# Patient Record
Sex: FEMALE | Race: WHITE | NOT HISPANIC OR LATINO | Employment: OTHER | URBAN - NONMETROPOLITAN AREA
[De-identification: names, ages, dates, MRNs, and addresses within clinical notes are randomized per-mention and may not be internally consistent; named-entity substitution may affect disease eponyms.]

---

## 2017-03-01 RX ORDER — FLUTICASONE PROPIONATE 50 MCG
SPRAY, SUSPENSION (ML) NASAL
Qty: 16 G | Refills: 0 | Status: SHIPPED | OUTPATIENT
Start: 2017-03-01 | End: 2017-03-31

## 2017-07-05 DIAGNOSIS — N32.81 OVERACTIVE BLADDER: ICD-10-CM

## 2017-07-05 DIAGNOSIS — N32.81 OVERACTIVE BLADDER: Primary | ICD-10-CM

## 2017-07-05 RX ORDER — OXYBUTYNIN CHLORIDE 5 MG/1
TABLET, EXTENDED RELEASE ORAL
Qty: 30 TABLET | Refills: 0 | OUTPATIENT
Start: 2017-07-05

## 2017-07-05 RX ORDER — OXYBUTYNIN CHLORIDE 5 MG/1
TABLET, EXTENDED RELEASE ORAL
Qty: 30 TABLET | Refills: 0 | Status: SHIPPED | OUTPATIENT
Start: 2017-07-05

## 2017-08-30 ENCOUNTER — OFFICE VISIT (OUTPATIENT)
Dept: OTOLARYNGOLOGY | Facility: CLINIC | Age: 82
End: 2017-08-30

## 2017-08-30 VITALS
WEIGHT: 127.6 LBS | HEIGHT: 69 IN | TEMPERATURE: 99.2 F | DIASTOLIC BLOOD PRESSURE: 76 MMHG | SYSTOLIC BLOOD PRESSURE: 148 MMHG | BODY MASS INDEX: 18.9 KG/M2

## 2017-08-30 DIAGNOSIS — T14.8XXA HEMATOMA: Primary | ICD-10-CM

## 2017-08-30 DIAGNOSIS — J34.2 NASAL SEPTAL DEVIATION: ICD-10-CM

## 2017-08-30 PROCEDURE — 10140 I&D HMTMA SEROMA/FLUID COLLJ: CPT | Performed by: OTOLARYNGOLOGY

## 2017-08-30 PROCEDURE — 99214 OFFICE O/P EST MOD 30 MIN: CPT | Performed by: OTOLARYNGOLOGY

## 2017-08-30 RX ORDER — CEPHALEXIN 500 MG/1
500 CAPSULE ORAL 3 TIMES DAILY
Qty: 21 CAPSULE | Refills: 0 | Status: SHIPPED | OUTPATIENT
Start: 2017-08-30

## 2017-08-30 RX ORDER — FLUTICASONE PROPIONATE 50 MCG
SPRAY, SUSPENSION (ML) NASAL
COMMUNITY
Start: 2017-07-01

## 2017-08-30 NOTE — PROGRESS NOTES
Preprocedure diagnosis: Forehead hematoma    Post procedure diagnosis: Forehead hematoma    Procedure performed: Incision and drainage of forehead hematoma    Surgeon: Oj Grajeda M.D.    Anesthesia: 1 mL 1% lidocaine with 1-100,000 epinephrine    Details: After patient verification and consent was obtained, the skin of the forehead was cleansed with alcohol and infiltrated with 1 mL of 1% lidocaine with 1-100,000 epinephrine.  After approximately 15 minutes, the skin was cleansed with Hibiclens.  Sterilely draped.  A 15 blade used to make a stab incision within a glabellar right ear.  I then passed mosquitoes and opened up the hematoma pocket.  Hematoma was milked out of this was the consistency of grape jelly.  Bactroban ointment was placed followed by Telfa, the head was wrapped in Kerlix and a compression dressing of the elastic wrap was placed.  She tolerated the procedure without any complication.

## 2017-08-30 NOTE — PROGRESS NOTES
PRIMARY CARE PROVIDER: Dontae Fletcher MD  REFERRING PROVIDER: No ref. provider found    Chief Complaint   Patient presents with   • Fracture     Nasal fracture with hematoma       Subjective   History of Present Illness:  Dana Pearson is a  86 y.o. female who is seen in consultation regarding facial trauma.  Ms. Pearson fell after stumbling on 08/25/2017, striking her forehead.  She reports significant swelling and bruising the forehead the periorbital areas.  She has some tenderness to her nose.  She did not lose consciousness.  She has moderate pain that is located in the periorbital area and forehead.  She is taking aspirin.  She had a CT scan of the face demonstrating a mid forehead scalp hematoma and nasal fractures.  She is not on antibiotics.  Nothing is made swelling any better.  Nothing is made it worse.    I performed incision and drainage of a right brow hematoma with a closed nasal reduction with stabilization on 01/28/2016 after a similar fall.    Review of Systems:  Review of Systems   Constitutional: Negative for chills and fever.   HENT: Positive for facial swelling and nosebleeds. Negative for congestion.    Eyes: Negative for pain, discharge and visual disturbance.   Skin: Positive for color change and wound.   Hematological: Bruises/bleeds easily.   All other systems reviewed and are negative.      Past History:  Past Medical History:   Diagnosis Date   • Atherosclerosis    • Bradycardia    • Essential hypertension    • GERD (gastroesophageal reflux disease)    • Hyperlipidemia    • Palpitations    • Weight loss      Past Surgical History:   Procedure Laterality Date   • CARDIAC CATHETERIZATION     • ENDOSCOPY     • NOSE SURGERY     • TONSILLECTOMY       Family History   Problem Relation Age of Onset   • Family history unknown: Yes   • Heart attack Mother    • Stroke Father      Social History   Substance Use Topics   • Smoking status: Never Smoker   • Smokeless tobacco: Never Used   • Alcohol use  No     Allergies:  Sulfa antibiotics    Current Outpatient Prescriptions:   •  amLODIPine (NORVASC) 5 MG tablet, , Disp: , Rfl:   •  aspirin 81 MG EC tablet, Take 81 mg by mouth daily., Disp: , Rfl:   •  citalopram (CeleXA) 20 MG tablet, Take 20 mg by mouth daily., Disp: , Rfl:   •  diclofenac (VOLTAREN) 75 MG EC tablet, Take 75 mg by mouth 2 (two) times a day., Disp: , Rfl:   •  Docusate Calcium (STOOL SOFTENER PO), Take  by mouth., Disp: , Rfl:   •  esomeprazole (NexIUM) 40 MG capsule, Take 40 mg by mouth every morning before breakfast., Disp: , Rfl:   •  fentaNYL (DURAGESIC) 50 MCG/HR patch, , Disp: , Rfl:   •  HYDROcodone-acetaminophen (NORCO) 5-325 MG per tablet, , Disp: , Rfl:   •  levothyroxine (SYNTHROID, LEVOTHROID) 25 MCG tablet, , Disp: , Rfl:   •  LORazepam (ATIVAN) 0.5 MG tablet, Take 0.5 mg by mouth 2 (two) times a day., Disp: , Rfl:   •  metoprolol tartrate (LOPRESSOR) 25 MG tablet, Take 25 mg by mouth daily., Disp: , Rfl:   •  oxybutynin XL (DITROPAN-XL) 5 MG 24 hr tablet, TAKE ONE TABLET BY MOUTH EVERY DAY FOR SPASMS, Disp: 30 tablet, Rfl: 0  •  Probiotic Product (PROBIOTIC ADVANCED PO), Take  by mouth daily., Disp: , Rfl:   •  cephalexin (KEFLEX) 500 MG capsule, Take 1 capsule by mouth 3 (Three) Times a Day., Disp: 21 capsule, Rfl: 0  •  fluticasone (FLONASE) 50 MCG/ACT nasal spray, , Disp: , Rfl:       Objective     Vital Signs:  Temp:  [99.2 °F (37.3 °C)] 99.2 °F (37.3 °C)  BP: (148)/(76) 148/76    Physical Exam:  Physical Exam   Constitutional: She is oriented to person, place, and time. She appears well-developed and well-nourished. She is cooperative. No distress.   HENT:   Head: Normocephalic. Head is with raccoon's eyes and with contusion. Head is without Brian's sign, without abrasion and without laceration.       Right Ear: Tympanic membrane, external ear and ear canal normal. No swelling or tenderness. No hemotympanum. Decreased hearing is noted.   Left Ear: Tympanic membrane, external  ear and ear canal normal. No swelling or tenderness. No hemotympanum. Decreased hearing is noted.   Nose: Nasal deformity (shifted to the left with mild tenderness.) and septal deviation present. No nose lacerations or nasal septal hematoma. No epistaxis.   Mouth/Throat: Uvula is midline, oropharynx is clear and moist and mucous membranes are normal.   Eyes: Conjunctivae and EOM are normal. Pupils are equal, round, and reactive to light. Right eye exhibits no discharge. Left eye exhibits no discharge. No scleral icterus.   Neck: Normal range of motion. Neck supple. No JVD present. No tracheal deviation present. No thyromegaly present.   Pulmonary/Chest: Effort normal. No stridor.   Musculoskeletal: Normal range of motion. She exhibits no edema or deformity.   Lymphadenopathy:     She has no cervical adenopathy.   Neurological: She is alert and oriented to person, place, and time. She has normal strength. No cranial nerve deficit. Coordination normal.   Skin: Skin is warm and dry. No rash noted. She is not diaphoretic. No erythema. No pallor.   Psychiatric: She has a normal mood and affect. Her speech is normal and behavior is normal. Judgment and thought content normal. Cognition and memory are normal.   Nursing note and vitals reviewed.      Results Review:   I have personally reviewed the CT of the facial bones on disc.  The following is my interpretation: There are bilateral comminuted nasal bone fractures with deflection of the pyramid to the left.  The septum is off to the left.          No frontal fracture:      Assessment   Assessment:  1. Hematoma        Plan   Plan:  Hematoma drained.  Not interested in CNR and understands risk of nasal deformity.  F/U next week.  Keep compression dressing on for 48 hours.    New Medications Ordered This Visit   Medications   • cephalexin (KEFLEX) 500 MG capsule     Sig: Take 1 capsule by mouth 3 (Three) Times a Day.     Dispense:  21 capsule     Refill:  0       Return in  about 1 week (around 9/6/2017).    My findings and recommendations were discussed and questions were answered.     Oj Grajeda MD  08/30/17  3:51 PM

## 2017-09-07 ENCOUNTER — OFFICE VISIT (OUTPATIENT)
Dept: OTOLARYNGOLOGY | Facility: CLINIC | Age: 82
End: 2017-09-07

## 2017-09-07 VITALS
TEMPERATURE: 97.9 F | HEIGHT: 69 IN | DIASTOLIC BLOOD PRESSURE: 74 MMHG | SYSTOLIC BLOOD PRESSURE: 136 MMHG | HEART RATE: 60 BPM | WEIGHT: 127 LBS | BODY MASS INDEX: 18.81 KG/M2

## 2017-09-07 DIAGNOSIS — T14.8XXA HEMATOMA: Primary | ICD-10-CM

## 2017-09-07 DIAGNOSIS — S02.2XXD CLOSED FRACTURE OF NASAL BONE WITH ROUTINE HEALING, SUBSEQUENT ENCOUNTER: ICD-10-CM

## 2017-09-07 DIAGNOSIS — J34.2 NASAL SEPTAL DEVIATION: ICD-10-CM

## 2017-09-07 PROCEDURE — 99024 POSTOP FOLLOW-UP VISIT: CPT | Performed by: NURSE PRACTITIONER

## 2017-09-07 NOTE — PROGRESS NOTES
PRIMARY CARE PROVIDER: Dontae Fletcher MD  REFERRING PROVIDER: No ref. provider found    Chief Complaint   Patient presents with   • Follow-up     Nasal FX and I&D of hematoma       Subjective   History of Present Illness:  Dana Pearson is a  86 y.o. female  who presents for follow up s/p I&D of hematoma on the forehead on August 30, 2017 due to a fall. The patient has had a relatively normal postoperative course and currently has no related complaints. She reports a significant improvement in swelling and bruising located over the forehead and periorbital areas. She denies any recurrent hematoma or bleeding. She also suffered bilateral nasal bone fractures at the time of the fall. She denies any fever, chills, pain, nasal obstruction, or change in breathing through her nose. She is currently taking Keflex and will finish this today.     Review of Systems:  Review of Systems   Constitutional: Negative for chills and fever.   HENT: Positive for facial swelling. Negative for congestion and nosebleeds.    Eyes: Negative for pain, discharge and visual disturbance.   Musculoskeletal: Positive for gait problem.   Skin: Positive for color change. Negative for wound.   Hematological: Bruises/bleeds easily.   All other systems reviewed and are negative.      Past History:  Past Medical History:   Diagnosis Date   • Atherosclerosis    • Bradycardia    • Essential hypertension    • GERD (gastroesophageal reflux disease)    • Hyperlipidemia    • Palpitations    • Weight loss      Past Surgical History:   Procedure Laterality Date   • CARDIAC CATHETERIZATION     • ENDOSCOPY     • NOSE SURGERY     • TONSILLECTOMY       Family History   Problem Relation Age of Onset   • Family history unknown: Yes   • Heart attack Mother    • Stroke Father      Social History   Substance Use Topics   • Smoking status: Never Smoker   • Smokeless tobacco: Never Used   • Alcohol use No     Allergies:  Sulfa antibiotics    Current Outpatient  Prescriptions:   •  amLODIPine (NORVASC) 5 MG tablet, , Disp: , Rfl:   •  aspirin 81 MG EC tablet, Take 81 mg by mouth daily., Disp: , Rfl:   •  cephalexin (KEFLEX) 500 MG capsule, Take 1 capsule by mouth 3 (Three) Times a Day., Disp: 21 capsule, Rfl: 0  •  citalopram (CeleXA) 20 MG tablet, Take 20 mg by mouth daily., Disp: , Rfl:   •  diclofenac (VOLTAREN) 75 MG EC tablet, Take 75 mg by mouth 2 (two) times a day., Disp: , Rfl:   •  Docusate Calcium (STOOL SOFTENER PO), Take  by mouth., Disp: , Rfl:   •  esomeprazole (NexIUM) 40 MG capsule, Take 40 mg by mouth every morning before breakfast., Disp: , Rfl:   •  fentaNYL (DURAGESIC) 50 MCG/HR patch, , Disp: , Rfl:   •  fluticasone (FLONASE) 50 MCG/ACT nasal spray, , Disp: , Rfl:   •  HYDROcodone-acetaminophen (NORCO) 5-325 MG per tablet, , Disp: , Rfl:   •  levothyroxine (SYNTHROID, LEVOTHROID) 25 MCG tablet, , Disp: , Rfl:   •  LORazepam (ATIVAN) 0.5 MG tablet, Take 0.5 mg by mouth 2 (two) times a day., Disp: , Rfl:   •  metoprolol tartrate (LOPRESSOR) 25 MG tablet, Take 25 mg by mouth daily., Disp: , Rfl:   •  oxybutynin XL (DITROPAN-XL) 5 MG 24 hr tablet, TAKE ONE TABLET BY MOUTH EVERY DAY FOR SPASMS, Disp: 30 tablet, Rfl: 0  •  Probiotic Product (PROBIOTIC ADVANCED PO), Take  by mouth daily., Disp: , Rfl:       Objective     Vital Signs:  Temp:  [97.9 °F (36.6 °C)] 97.9 °F (36.6 °C)  Heart Rate:  [60] 60  BP: (136)/(74) 136/74    Physical Exam:  Physical Exam   Constitutional: She is oriented to person, place, and time. She appears well-developed and well-nourished. She is cooperative. No distress.   HENT:   Head: Normocephalic. Head is with raccoon's eyes and with contusion. Head is without Brian's sign, without abrasion and without laceration.       Right Ear: Tympanic membrane, external ear and ear canal normal. No swelling or tenderness. No hemotympanum. Decreased hearing is noted.   Left Ear: Tympanic membrane, external ear and ear canal normal. No swelling  or tenderness. No hemotympanum. Decreased hearing is noted.   Nose: Nasal deformity (shifted to the left with mild tenderness.) and septal deviation present. No nose lacerations or nasal septal hematoma. No epistaxis.   Mouth/Throat: Uvula is midline, oropharynx is clear and moist and mucous membranes are normal.   Bruising to forehead and periorbital areas improving   Eyes: Conjunctivae and EOM are normal. Pupils are equal, round, and reactive to light. Right eye exhibits no discharge. Left eye exhibits no discharge. No scleral icterus.   Neck: Normal range of motion and phonation normal. Neck supple. No tracheal deviation present.   Pulmonary/Chest: Effort normal. No stridor.   Musculoskeletal: Normal range of motion. She exhibits no edema or deformity.   Lymphadenopathy:     She has no cervical adenopathy.   Neurological: She is alert and oriented to person, place, and time. She has normal strength. No cranial nerve deficit. Coordination normal.   Skin: Skin is warm and dry. Bruising noted. No rash noted. She is not diaphoretic. No erythema. No pallor.   Psychiatric: She has a normal mood and affect. Her speech is normal and behavior is normal. Judgment and thought content normal. Cognition and memory are normal.   Nursing note and vitals reviewed.        Assessment   Assessment:  1. Hematoma    2. Closed fracture of nasal bone with routine healing, subsequent encounter    3. Nasal septal deviation        Plan   Plan:    Hematoma resolved. She is still not interested in CNR and understands risks of deformity. Call for any problems or worsening symptoms.     Return if symptoms worsen or fail to improve, for Recheck.    My findings and recommendations were discussed and questions were answered.     Angi Shafer, VIOLET  09/07/17  2:19 PM

## 2017-09-20 ENCOUNTER — OFFICE VISIT (OUTPATIENT)
Dept: CARDIOLOGY | Facility: CLINIC | Age: 82
End: 2017-09-20

## 2017-09-20 VITALS
OXYGEN SATURATION: 94 % | DIASTOLIC BLOOD PRESSURE: 60 MMHG | HEIGHT: 69 IN | SYSTOLIC BLOOD PRESSURE: 110 MMHG | BODY MASS INDEX: 18.66 KG/M2 | HEART RATE: 54 BPM | WEIGHT: 126 LBS

## 2017-09-20 DIAGNOSIS — I25.10 CORONARY ARTERY DISEASE INVOLVING NATIVE CORONARY ARTERY OF NATIVE HEART WITHOUT ANGINA PECTORIS: Primary | ICD-10-CM

## 2017-09-20 DIAGNOSIS — I10 ESSENTIAL HYPERTENSION: ICD-10-CM

## 2017-09-20 DIAGNOSIS — R00.1 SINUS BRADYCARDIA: ICD-10-CM

## 2017-09-20 DIAGNOSIS — E78.5 HYPERLIPIDEMIA, UNSPECIFIED HYPERLIPIDEMIA TYPE: ICD-10-CM

## 2017-09-20 PROCEDURE — 93000 ELECTROCARDIOGRAM COMPLETE: CPT | Performed by: INTERNAL MEDICINE

## 2017-09-20 PROCEDURE — 99214 OFFICE O/P EST MOD 30 MIN: CPT | Performed by: INTERNAL MEDICINE

## 2017-09-20 RX ORDER — AMLODIPINE BESYLATE 2.5 MG/1
2.5 TABLET ORAL DAILY
Qty: 30 TABLET | Refills: 11 | Status: SHIPPED | OUTPATIENT
Start: 2017-09-20

## 2017-09-20 NOTE — PROGRESS NOTES
Reason for Visit: cardiovascular follow up.    HPI:  Dana Pearson is a 86 y.o. female is here today for follow-up.  She has fallen and broke her nose.  She tripped as the cause of her fall.  She also notes being tired and weak.  Denies any syncope, PND, or orthopnea.  She gets anxious intermittently and when she does she will have some pain in her chest.  She felt like she lost a lot of blood when she fell and had to have the hematoma drained.  Arthritis has also been bothering her.  She desires to stop any possible medications that can be discontinued.    Previous Cardiac Testing and Procedures:  - Holter monitor (06/19/2014) rare atrial ventricular ectopy with short runs of atrial tachycardia up to 5 beats.  - Lipid panel (3/7/2005) 172/50/100/108    Patient Active Problem List   Diagnosis   • Hyperlipidemia   • Essential hypertension   • Coronary artery disease involving native coronary artery of native heart without angina pectoris   • Chronic pain syndrome   • Depression       Social History   Substance Use Topics   • Smoking status: Passive Smoke Exposure - Never Smoker   • Smokeless tobacco: Never Used   • Alcohol use No       Family History   Problem Relation Age of Onset   • Family history unknown: Yes   • Heart attack Mother    • Stroke Father        The following portions of the patient's history were reviewed and updated as appropriate: allergies, current medications, past family history, past medical history, past social history, past surgical history and problem list.      Current Outpatient Prescriptions:   •  amLODIPine (NORVASC) 2.5 MG tablet, Take 1 tablet by mouth Daily., Disp: 30 tablet, Rfl: 11  •  aspirin 81 MG EC tablet, Take 81 mg by mouth daily., Disp: , Rfl:   •  cephalexin (KEFLEX) 500 MG capsule, Take 1 capsule by mouth 3 (Three) Times a Day., Disp: 21 capsule, Rfl: 0  •  citalopram (CeleXA) 20 MG tablet, Take 20 mg by mouth daily., Disp: , Rfl:   •  diclofenac (VOLTAREN) 75 MG EC  "tablet, Take 75 mg by mouth 2 (two) times a day., Disp: , Rfl:   •  Docusate Calcium (STOOL SOFTENER PO), Take  by mouth., Disp: , Rfl:   •  esomeprazole (NexIUM) 40 MG capsule, Take 40 mg by mouth every morning before breakfast., Disp: , Rfl:   •  fentaNYL (DURAGESIC) 50 MCG/HR patch, , Disp: , Rfl:   •  fluticasone (FLONASE) 50 MCG/ACT nasal spray, , Disp: , Rfl:   •  HYDROcodone-acetaminophen (NORCO) 5-325 MG per tablet, , Disp: , Rfl:   •  levothyroxine (SYNTHROID, LEVOTHROID) 25 MCG tablet, , Disp: , Rfl:   •  LORazepam (ATIVAN) 0.5 MG tablet, Take 0.5 mg by mouth 2 (two) times a day., Disp: , Rfl:   •  oxybutynin XL (DITROPAN-XL) 5 MG 24 hr tablet, TAKE ONE TABLET BY MOUTH EVERY DAY FOR SPASMS, Disp: 30 tablet, Rfl: 0  •  Probiotic Product (PROBIOTIC ADVANCED PO), Take  by mouth daily., Disp: , Rfl:     Review of Systems   Constitution: Positive for malaise/fatigue. Negative for chills and fever.   HENT: Negative for congestion.    Eyes: Negative for blurred vision and double vision.   Cardiovascular: Positive for chest pain (occ). Negative for irregular heartbeat, leg swelling and palpitations (occ).   Respiratory: Positive for shortness of breath. Negative for cough.    Endocrine: Negative for cold intolerance and heat intolerance.   Hematologic/Lymphatic: Bruises/bleeds easily.   Skin: Negative for itching and rash.   Musculoskeletal: Positive for neck pain. Negative for back pain, joint pain and muscle cramps.   Gastrointestinal: Negative for constipation, diarrhea, heartburn and melena.   Genitourinary: Negative for frequency and hematuria.   Neurological: Positive for dizziness, headaches and light-headedness.   Psychiatric/Behavioral: Positive for depression. The patient is nervous/anxious. The patient does not have insomnia.        Objective   /60 (BP Location: Left arm, Patient Position: Sitting, Cuff Size: Adult)  Pulse 54  Ht 69\" (175.3 cm)  Wt 126 lb (57.2 kg)  SpO2 94%  BMI 18.61 " kg/m2  Physical Exam   Constitutional: She is oriented to person, place, and time. She appears well-developed and well-nourished.   HENT:   Head: Normocephalic and atraumatic.   Cardiovascular: Regular rhythm and normal heart sounds.  Bradycardia present.    No murmur heard.  Pulmonary/Chest: Effort normal and breath sounds normal.   Musculoskeletal: She exhibits no edema.   Neurological: She is alert and oriented to person, place, and time.   Skin: Skin is warm and dry.   Psychiatric: She has a normal mood and affect.       ECG 12 Lead  Date/Time: 9/20/2017 1:33 PM  Performed by: SANTOSH NGUYEN  Authorized by: SANTOSH NGUYEN   Comparison: compared with previous ECG from 1/27/2016  Comparison to previous ECG: Premature atrial contractions are no longer present  Rhythm: sinus bradycardia  Q waves: V1 and V2                ICD-10-CM ICD-9-CM   1. Coronary artery disease involving native coronary artery of native heart without angina pectoris I25.10 414.01   2. Essential hypertension I10 401.9   3. Hyperlipidemia, unspecified hyperlipidemia type E78.5 272.4   4. Sinus bradycardia R00.1 427.89         Assessment/Plan:  1.  Coronary artery disease: Currently managed on aspirin and metoprolol.  Intolerant of statins.  Will stop metoprolol due to fatigue and bradycardia.    2.  Essential hypertension: Blood pressure running low today.  Possibly contributing to fatigue.  Will decrease amlodipine to 2.5 mg daily and discontinue metoprolol.    3.  Hyperlipidemia: Intolerant of statins.  Declined any further lipid testing.  Continue to optimize lifestyle modification.    4.  Sinus bradycardia: Likely secondary to metoprolol.  Will discontinue as this is likely contributing to her fatigue.